# Patient Record
Sex: FEMALE
[De-identification: names, ages, dates, MRNs, and addresses within clinical notes are randomized per-mention and may not be internally consistent; named-entity substitution may affect disease eponyms.]

---

## 2022-10-21 ENCOUNTER — NON-APPOINTMENT (OUTPATIENT)
Age: 52
End: 2022-10-21

## 2022-11-08 ENCOUNTER — APPOINTMENT (OUTPATIENT)
Dept: ORTHOPEDIC SURGERY | Facility: CLINIC | Age: 52
End: 2022-11-08

## 2022-11-08 ENCOUNTER — NON-APPOINTMENT (OUTPATIENT)
Age: 52
End: 2022-11-08

## 2022-11-08 DIAGNOSIS — S83.242A OTHER TEAR OF MEDIAL MENISCUS, CURRENT INJURY, LEFT KNEE, INITIAL ENCOUNTER: ICD-10-CM

## 2022-11-08 PROBLEM — Z00.00 ENCOUNTER FOR PREVENTIVE HEALTH EXAMINATION: Status: ACTIVE | Noted: 2022-11-08

## 2022-11-08 PROCEDURE — 99204 OFFICE O/P NEW MOD 45 MIN: CPT

## 2022-11-08 PROCEDURE — 73564 X-RAY EXAM KNEE 4 OR MORE: CPT | Mod: LT

## 2022-11-08 RX ORDER — MELOXICAM 7.5 MG/1
7.5 TABLET ORAL DAILY
Qty: 21 | Refills: 2 | Status: COMPLETED | COMMUNITY
Start: 2022-11-08 | End: 2023-01-10

## 2022-11-08 NOTE — DISCUSSION/SUMMARY
[de-identified] : LUCIA ANGELES is a 52 year y/o female who presents for initial visit of left knee pain x 3 weeks. She states that she is having difficulty walking, often losing her balance. She is taking OTC NSAIDs as needed. She states that her pain worsens with flexion and kneeling. Denies paresthesias. She admits to intermittent buckling after sedentary periods. She denies pain in the hip and groin. She admits to pain in the feet, but that is not new. She states that she runs 5 miles, 3 days a week. She notes relief of symptoms when wearing clogs at work. \par \par We had a thorough discussion regarding the nature of her pain, the pathophysiology, as well as all treatment options. Patient was given prescription of formal physical therapy that she will perform 2x/wk for 6-8 wks. A prescription of Meloxicam was given to be taken as directed with food to prevent GI upset, if occurs pt to D/C and call us at that time. She will return to activity as tolerated. Considering the patient's current presentation of pain, physical exam, and radiographs an MRI is indicated at this time. A prescription for this was given to the patient. We will go over the MRI results with her upon obtaining the results in the office and advise her of further treatment options. She agrees with the above plan and all questions were answered. \par \par \par \par

## 2022-11-08 NOTE — PHYSICAL EXAM
[de-identified] : Physical Exam: \par General: Well appearing, no acute distress \par Neurologic: A&Ox3, No focal deficits \par Head: NCAT without abrasions, lacerations, or ecchymosis to head, face, or scalp \par Eyes: No scleral icterus, no gross abnormalities \par Respiratory: Equal chest wall expansion bilaterally, no accessory muscle use \par Lymphatic: No lymphadenopathy palpated \par Skin: Warm and dry \par Psychiatric: Normal mood and affect \par \par Left Knee: \par Range of Motion in Degrees   \par Flexion Active  135-degrees \par Flexion Passive  135-degrees \par Extension Active  0-5-degrees \par Extension Passive  0-5-degrees   \par \par No effusion. No weakness to flexion/extension. No evidence of instability in the AP plane or varus or valgus stress. Negative Lachman. Negative pivot shift. Negative anterior drawer test. Negative posterior drawer test. Negative Jennie. Negative Apley grind. negative Raman test. negative FADIR. Medial joint line tenderness.  IT band tenderness. No tenderness over the medial and lateral facet of the patella. No tenderness over the tibial tubercle. No patellofemoral crepitations. No lateral tilting patella. No patellar apprehension. No crepitation in the medial and lateral femoral condyle. No proximal or distal swelling, edema or tenderness. No gross motor or sensory deficits. No intra-articular swelling. 2+ DP and PT pulses. No varus or valgus malalignment. Skin is intact. No rashes, scars or lesions. Quad strength normal. Motor exam 5/5 distally, EHL/FHL/GSC/TA    [de-identified] : The following radiographs were ordered and read by me during this patients visit. I reviewed each radiograph in detail with the patient and discussed the findings as highlighted below.   4 views of the left knee show no fracture, dislocation or bony lesions. There is no evidence of degenerative change in the medial, lateral and patellofemoral compartments with maintenance of the joint space. Otherwise unremarkable.

## 2022-11-08 NOTE — ADDENDUM
[FreeTextEntry1] : Documented by Krupa Horton acting as a scribe for Dr. Rich and Fabricio Walsh PA-C on 11/08/2022.   All medical record entries made by the Scribe were at my, Dr. Rich, and Fabricio Walsh's, direction and personally dictated by me on 11/08/2022. I have reviewed the chart and agree that the record accurately reflects my personal performance of the history, physical exam, procedure and imaging.

## 2022-11-08 NOTE — HISTORY OF PRESENT ILLNESS
[Stable] : stable [___ wks] : [unfilled] week(s) ago [4] : an average pain level of 4/10 [3] : a minimum pain level of 3/10 [7] : a maximum pain level of 7/10 [Knee Flexion] : worsened with knee flexion [de-identified] : LUCIA ANGELES is a 52 year y/o female who presents for initial visit of left knee pain x 3 weeks. She states that she is having difficulty walking, often losing her balance. She is taking OTC NSAIDs as needed. She states that her pain worsens with flexion and kneeling. Denies paresthesias. She admits to intermittent buckling after sedentary periods. She denies pain in the hip and groin. She admits to pain in the feet, but that is not new. She states that she runs 5 miles, 3 days a week. She notes relief of symptoms when wearing clogs at work.